# Patient Record
Sex: FEMALE | Race: WHITE | NOT HISPANIC OR LATINO | URBAN - METROPOLITAN AREA
[De-identification: names, ages, dates, MRNs, and addresses within clinical notes are randomized per-mention and may not be internally consistent; named-entity substitution may affect disease eponyms.]

---

## 2018-02-28 ENCOUNTER — OUTPATIENT (OUTPATIENT)
Dept: OUTPATIENT SERVICES | Facility: HOSPITAL | Age: 63
LOS: 1 days | Discharge: HOME | End: 2018-02-28

## 2018-02-28 DIAGNOSIS — Z12.31 ENCOUNTER FOR SCREENING MAMMOGRAM FOR MALIGNANT NEOPLASM OF BREAST: ICD-10-CM

## 2019-05-15 ENCOUNTER — OUTPATIENT (OUTPATIENT)
Dept: OUTPATIENT SERVICES | Facility: HOSPITAL | Age: 64
LOS: 1 days | Discharge: HOME | End: 2019-05-15
Payer: COMMERCIAL

## 2019-05-15 DIAGNOSIS — Z12.31 ENCOUNTER FOR SCREENING MAMMOGRAM FOR MALIGNANT NEOPLASM OF BREAST: ICD-10-CM

## 2019-05-15 PROCEDURE — 77067 SCR MAMMO BI INCL CAD: CPT | Mod: 26

## 2019-05-15 PROCEDURE — 77063 BREAST TOMOSYNTHESIS BI: CPT | Mod: 26

## 2019-05-16 DIAGNOSIS — Z78.0 ASYMPTOMATIC MENOPAUSAL STATE: ICD-10-CM

## 2019-05-16 DIAGNOSIS — M89.9 DISORDER OF BONE, UNSPECIFIED: ICD-10-CM

## 2019-05-16 DIAGNOSIS — Z13.820 ENCOUNTER FOR SCREENING FOR OSTEOPOROSIS: ICD-10-CM

## 2020-09-30 ENCOUNTER — OUTPATIENT (OUTPATIENT)
Dept: OUTPATIENT SERVICES | Facility: HOSPITAL | Age: 65
LOS: 1 days | Discharge: HOME | End: 2020-09-30
Payer: COMMERCIAL

## 2020-09-30 DIAGNOSIS — Z12.31 ENCOUNTER FOR SCREENING MAMMOGRAM FOR MALIGNANT NEOPLASM OF BREAST: ICD-10-CM

## 2020-09-30 PROCEDURE — 77067 SCR MAMMO BI INCL CAD: CPT | Mod: 26

## 2020-09-30 PROCEDURE — 77063 BREAST TOMOSYNTHESIS BI: CPT | Mod: 26

## 2022-02-03 ENCOUNTER — OUTPATIENT (OUTPATIENT)
Dept: OUTPATIENT SERVICES | Facility: HOSPITAL | Age: 67
LOS: 1 days | Discharge: HOME | End: 2022-02-03
Payer: COMMERCIAL

## 2022-02-03 DIAGNOSIS — Z12.31 ENCOUNTER FOR SCREENING MAMMOGRAM FOR MALIGNANT NEOPLASM OF BREAST: ICD-10-CM

## 2022-02-03 PROCEDURE — 77067 SCR MAMMO BI INCL CAD: CPT | Mod: 26

## 2022-02-03 PROCEDURE — 77063 BREAST TOMOSYNTHESIS BI: CPT | Mod: 26

## 2022-02-04 DIAGNOSIS — Z13.820 ENCOUNTER FOR SCREENING FOR OSTEOPOROSIS: ICD-10-CM

## 2022-02-04 DIAGNOSIS — M89.9 DISORDER OF BONE, UNSPECIFIED: ICD-10-CM

## 2022-02-04 DIAGNOSIS — Z78.0 ASYMPTOMATIC MENOPAUSAL STATE: ICD-10-CM

## 2023-08-09 ENCOUNTER — OUTPATIENT (OUTPATIENT)
Dept: OUTPATIENT SERVICES | Facility: HOSPITAL | Age: 68
LOS: 1 days | End: 2023-08-09
Payer: COMMERCIAL

## 2023-08-09 DIAGNOSIS — Z12.31 ENCOUNTER FOR SCREENING MAMMOGRAM FOR MALIGNANT NEOPLASM OF BREAST: ICD-10-CM

## 2023-08-09 PROCEDURE — 77063 BREAST TOMOSYNTHESIS BI: CPT

## 2023-08-09 PROCEDURE — 77067 SCR MAMMO BI INCL CAD: CPT

## 2023-08-09 PROCEDURE — 77067 SCR MAMMO BI INCL CAD: CPT | Mod: 26

## 2023-08-09 PROCEDURE — 77063 BREAST TOMOSYNTHESIS BI: CPT | Mod: 26

## 2023-08-10 DIAGNOSIS — Z12.31 ENCOUNTER FOR SCREENING MAMMOGRAM FOR MALIGNANT NEOPLASM OF BREAST: ICD-10-CM

## 2023-09-27 PROBLEM — Z00.00 ENCOUNTER FOR PREVENTIVE HEALTH EXAMINATION: Status: ACTIVE | Noted: 2023-09-27

## 2024-09-18 ENCOUNTER — EMERGENCY (EMERGENCY)
Facility: HOSPITAL | Age: 69
LOS: 0 days | Discharge: HOME | End: 2024-09-18
Attending: STUDENT IN AN ORGANIZED HEALTH CARE EDUCATION/TRAINING PROGRAM
Payer: MEDICARE

## 2024-09-18 VITALS
OXYGEN SATURATION: 100 % | HEIGHT: 65 IN | WEIGHT: 138.01 LBS | RESPIRATION RATE: 18 BRPM | DIASTOLIC BLOOD PRESSURE: 88 MMHG | TEMPERATURE: 98 F | SYSTOLIC BLOOD PRESSURE: 152 MMHG | HEART RATE: 67 BPM

## 2024-09-18 DIAGNOSIS — K21.9 GASTRO-ESOPHAGEAL REFLUX DISEASE WITHOUT ESOPHAGITIS: ICD-10-CM

## 2024-09-18 DIAGNOSIS — M54.89 OTHER DORSALGIA: ICD-10-CM

## 2024-09-18 DIAGNOSIS — M54.6 PAIN IN THORACIC SPINE: ICD-10-CM

## 2024-09-18 PROCEDURE — 96372 THER/PROPH/DIAG INJ SC/IM: CPT

## 2024-09-18 PROCEDURE — 93005 ELECTROCARDIOGRAM TRACING: CPT

## 2024-09-18 PROCEDURE — 99285 EMERGENCY DEPT VISIT HI MDM: CPT | Mod: FS

## 2024-09-18 PROCEDURE — 93010 ELECTROCARDIOGRAM REPORT: CPT

## 2024-09-18 PROCEDURE — 99283 EMERGENCY DEPT VISIT LOW MDM: CPT | Mod: 25

## 2024-09-18 PROCEDURE — 71046 X-RAY EXAM CHEST 2 VIEWS: CPT

## 2024-09-18 PROCEDURE — 71046 X-RAY EXAM CHEST 2 VIEWS: CPT | Mod: 26

## 2024-09-18 RX ORDER — KETOROLAC TROMETHAMINE 30 MG/ML
30 INJECTION, SOLUTION INTRAMUSCULAR ONCE
Refills: 0 | Status: DISCONTINUED | OUTPATIENT
Start: 2024-09-18 | End: 2024-09-18

## 2024-09-18 RX ADMIN — KETOROLAC TROMETHAMINE 30 MILLIGRAM(S): 30 INJECTION, SOLUTION INTRAMUSCULAR at 15:49

## 2024-09-18 NOTE — ED PROVIDER NOTE - ATTENDING APP SHARED VISIT CONTRIBUTION OF CARE
70 yo F with hx of GERD who presents with nonradiating R upper back pain x1 wk. Taking advil without relief. Called PMD who made 1st appt for 9/30 but pt couldn't wait until then so came to ED. No fever, chills, night sweats, weight loss, BLE weakness/numbness, saddle anesthesia, urinary/fecal incontinence/retention, dysuria, hematuria. No recent trauma/spinal instrumentation. No hx of immunocompromise. No hx of coagulopathy or AC use. No hx of IVDU. No rash. R hand dominant but denies heavy lifting.    PMD Dr. Nath    CONSTITUTIONAL: well developed, nontoxic appearing, in no acute distress, speaking in full sentences  SKIN: warm, dry, no rash, cap refill < 2 seconds  HEENT: normocephalic, atraumatic, no conjunctival erythema, moist mucous membranes, patent airway  NECK: supple  CV:  regular rate, regular rhythm, 2+ radial pulses bilaterally  RESP: no wheezes, no rales, no rhonchi, normal work of breathing  ABD: soft, no tenderness, nondistended, no rebound, no guarding  BACK: no midline T/L/S-spine tenderness, no step off, no deformity, mild paraspinal R thoracic tenderness, no ecchymosis, no vesicles  MSK: normal ROM, no cyanosis, no edema  NEURO: alert, oriented, grossly unremarkable  PSYCH: cooperative, appropriate    A&P:  Pt here with atraumatic back pain, likely MSK. No midline spine tenderness. No neuro deficits. Low suspicion for cauda equina syndrome, spinal cord injury, fx, transverse myelitis, epidural abscess/hematoma, osteomyelitis, discitis, pyelonephritis at this time given no red flag sx, no systemic sx, no hx of trauma, no focal neuro deficits, no urinary sx. Plan for ekg, cxr r/o arrhythmia, ptx, pneumonia.

## 2024-09-18 NOTE — ED ADULT TRIAGE NOTE - TEMPERATURE IN FAHRENHEIT (DEGREES F)
Patient's chart reviewed, please contact to schedule OA colonoscopy with .Patient Preference      Schedule Procedure:   Please Schedule Routine (next available or patient preference)  Procedure: Colonoscopy (49571) with MD preference for bowel prep.   Diagnosis: Colon Cancer Screening Z12.11  Is patient:    Diabetic? No   ANTIPLATELET / ANTICOAGULATION: MEDICATION:  None  Latex allergy: Yes- Pruritus (itchy)  Sleep apnea: No  Location: Patient Preference  Sedation: MAC Anesthesia (hx of Xanax on med list-anxiety/for sleep)    Special Instructions: None      Covid: Fully Vaccinated  Yes  Immunocompromised:  No        98.1

## 2024-09-18 NOTE — ED ADULT TRIAGE NOTE - NS ED NURSE DIRECT TO ROOM YN
Yes Admission Reconciliation is Not Complete  Discharge Reconciliation is Not Complete Admission Reconciliation is Completed  Discharge Reconciliation is Not Complete Admission Reconciliation is Completed  Discharge Reconciliation is Completed

## 2024-09-18 NOTE — ED PROVIDER NOTE - DIFFERENTIAL DIAGNOSIS
Differential Diagnosis MSK pain, cauda equina syndrome, spinal cord injury, fx, transverse myelitis, epidural abscess/hematoma, osteomyelitis, discitis, pyelonephritis, arrhythmia, ptx, pneumonia

## 2024-09-18 NOTE — ED ADULT NURSE NOTE - NSFALLUNIVINTERV_ED_ALL_ED
Bed/Stretcher in lowest position, wheels locked, appropriate side rails in place/Call bell, personal items and telephone in reach/Instruct patient to call for assistance before getting out of bed/chair/stretcher/Non-slip footwear applied when patient is off stretcher/[place] to call system/Physically safe environment - no spills, clutter or unnecessary equipment/Purposeful proactive rounding/Room/bathroom lighting operational, light cord in reach

## 2024-09-18 NOTE — ED PROVIDER NOTE - CLINICAL SUMMARY MEDICAL DECISION MAKING FREE TEXT BOX
Pt here with atraumatic back pain, likely MSK. No midline spine tenderness. No neuro deficits. Low suspicion for cauda equina syndrome, spinal cord injury, fx, transverse myelitis, epidural abscess/hematoma, osteomyelitis, discitis, pyelonephritis at this time given no red flag sx, no systemic sx, no hx of trauma, no focal neuro deficits, no urinary sx. Plan for ekg, cxr r/o arrhythmia, ptx, pneumonia. Ekg with mild TWI in aVL, no prior ekg for comparison. Cxr clear. Pain improved. Pt has close PMD f/u in 2 wks. Considered observation vs admission however pt is a good candidate for d/c home with outpatient f/u given that no life threatening pathology found in ED and pt has close outpatient f/u. Strict ED return precautions given. Pt verbalized understanding and was agreeable with plan.

## 2024-09-18 NOTE — ED PROVIDER NOTE - PATIENT PORTAL LINK FT
You can access the FollowMyHealth Patient Portal offered by [facility] by registering at the following website: [email or web address]. By joining [facility]’s FollowMyHealth portal, you will also be able to view your health information using other applications (apps) compatible with our system.

## 2024-09-18 NOTE — ED PROVIDER NOTE - NSFOLLOWUPINSTRUCTIONS_ED_ALL_ED_FT
Back Pain    WHAT YOU NEED TO KNOW:    What do I need to know about back pain? Back pain is common. You may have back pain and muscle spasms. You may feel sore or stiff on one or both sides of your back. The pain may spread to your lower body. Conditions that affect the spine, joints, or muscles can cause back pain. These may include arthritis, spinal stenosis (narrowing of the spinal column), muscle tension, or breakdown of the spinal discs.    What increases my risk for back pain?   •Repeated bending, lifting, or twisting, or lifting heavy items  •Injury from a fall or accident  •Lack of regular physical activity   •Obesity or pregnancy   •Smoking  •Aging  •Driving, sitting, or standing for long periods  •Bad posture while sitting or standing    How is back pain diagnosed? Your healthcare provider will ask if you have any medical conditions. He or she may ask if you have a history of back pain and how it started. He or she may watch you stand and walk, and check your range of motion. Show him or her where you feel pain and what makes it better or worse. Describe the pain, how bad it is, and how long it lasts. Tell your provider if your pain worsens at night or when you lie on your back.    How is back pain treated?   •Medicines:      •NSAIDs, such as ibuprofen, help decrease swelling, pain, and fever. This medicine is available with or without a doctor's order. NSAIDs may be given as a pill or as a cream that is applied to your back. NSAIDs can cause stomach bleeding or kidney problems in certain people. If you take blood thinner medicine, always ask your healthcare provider if NSAIDs are safe for you. Always read the medicine label and follow directions.     •Acetaminophen decreases pain and fever. It is available without a doctor's order. Ask how much to take and how often to take it. Follow directions. Read the labels of all other medicines you are using to see if they also contain acetaminophen, or ask your doctor or pharmacist. Acetaminophen can cause liver damage if not taken correctly. Do not use more than 4 grams (4,000 milligrams) total of acetaminophen in one day.      •Muscle relaxers help decrease muscle spasms and back pain.  •Acupressure may be recommended to decrease pain and improve movement. Acupressure is pressure or localized massage to the area of your back pain.   •A transcutaneous electrical nerve stimulation (TENS) unit is a portable, pocket-sized, battery-powered device that attaches to your skin. It is usually placed over the area of pain. It uses mild, safe electrical signals to help control pain.    How do I manage my back pain?   •Apply ice on your back for 15 to 20 minutes every hour or as directed. Use an ice pack, or put crushed ice in a plastic bag. Cover it with a towel before you apply it to your skin. Ice helps prevent tissue damage and decreases pain.  •Apply heat on your back for 20 to 30 minutes every 2 hours for as many days as directed. Heat helps decrease pain and muscle spasms.  •Stay active as much as you can without causing more pain. Bed rest could make your back pain worse. Avoid heavy lifting until your pain is gone.  •Go to physical therapy as directed. A physical therapist can teach you exercises to help improve movement and strength, and to decrease pain.    Call your local emergency number (911 in the [place]) if:   •You have severe back pain with chest pain.  •You cannot control your urine or bowel movements.  •Your pain becomes so severe that you cannot walk.    When should I seek immediate care?   •You have pain, numbness, or weakness in one or both legs.  •You have severe back pain, nausea, and vomiting.  •You have severe back pain that spreads to your side or genital area.    When should I call my doctor?   •You have back pain that does not get better with rest and pain medicine.  •You have a fever.  •You have pain that worsens when you are on your back or when you rest.  •You have pain that worsens when you cough or sneeze.  •You lose weight without trying.  •You have questions or concerns about your condition or care.    CARE AGREEMENT:    You have the right to help plan your care. Learn about your health condition and how it may be treated. Discuss treatment options with your healthcare providers to decide what care you want to receive. You always have the right to refuse treatment.

## 2024-09-18 NOTE — ED PROVIDER NOTE - OBJECTIVE STATEMENT
Patient is a 69-year-old female no known medical history here for evaluation of atraumatic right upper back pain for 5 days with no aggravating alleviating factors.  Patient denies cough, fever, chills, chest pain, shortness of breath, leg swelling, rash

## 2024-09-18 NOTE — ED PROVIDER NOTE - PHYSICAL EXAMINATION
VITAL SIGNS: I have reviewed nursing notes and confirm.  CONSTITUTIONAL: Well-developed; well-nourished  SKIN:  skin exam is warm and dry, no acute rash.    HEAD: Normocephalic; atraumatic.  EYES:  conjunctiva and sclera clear.  ENT: No nasal discharge; airway clear.  CARD: S1, S2 normal; no murmurs, gallops, or rubs. Regular rate and rhythm.   RESP: No wheezes, rales or rhonchi.  EXT: Normal ROM.  No clubbing, cyanosis or edema.   NEURO: Alert, oriented, grossly unremarkable

## 2024-11-07 PROBLEM — K21.9 GASTRO-ESOPHAGEAL REFLUX DISEASE WITHOUT ESOPHAGITIS: Chronic | Status: ACTIVE | Noted: 2024-09-18

## 2024-11-11 ENCOUNTER — OUTPATIENT (OUTPATIENT)
Dept: OUTPATIENT SERVICES | Facility: HOSPITAL | Age: 69
LOS: 1 days | End: 2024-11-11
Payer: COMMERCIAL

## 2024-11-11 DIAGNOSIS — Z13.820 ENCOUNTER FOR SCREENING FOR OSTEOPOROSIS: ICD-10-CM

## 2024-11-11 DIAGNOSIS — Z00.8 ENCOUNTER FOR OTHER GENERAL EXAMINATION: ICD-10-CM

## 2024-11-11 DIAGNOSIS — Z12.31 ENCOUNTER FOR SCREENING MAMMOGRAM FOR MALIGNANT NEOPLASM OF BREAST: ICD-10-CM

## 2024-11-11 PROCEDURE — 77080 DXA BONE DENSITY AXIAL: CPT

## 2024-11-11 PROCEDURE — 77080 DXA BONE DENSITY AXIAL: CPT | Mod: 26

## 2024-11-11 PROCEDURE — 77063 BREAST TOMOSYNTHESIS BI: CPT

## 2024-11-11 PROCEDURE — 77063 BREAST TOMOSYNTHESIS BI: CPT | Mod: 26

## 2024-11-11 PROCEDURE — 77067 SCR MAMMO BI INCL CAD: CPT

## 2024-11-11 PROCEDURE — 77067 SCR MAMMO BI INCL CAD: CPT | Mod: 26

## 2024-11-12 DIAGNOSIS — Z13.820 ENCOUNTER FOR SCREENING FOR OSTEOPOROSIS: ICD-10-CM

## 2024-11-12 DIAGNOSIS — Z12.31 ENCOUNTER FOR SCREENING MAMMOGRAM FOR MALIGNANT NEOPLASM OF BREAST: ICD-10-CM

## 2025-06-01 ENCOUNTER — INPATIENT (INPATIENT)
Facility: HOSPITAL | Age: 70
LOS: 1 days | Discharge: HOME | DRG: 391 | End: 2025-06-03
Attending: STUDENT IN AN ORGANIZED HEALTH CARE EDUCATION/TRAINING PROGRAM | Admitting: STUDENT IN AN ORGANIZED HEALTH CARE EDUCATION/TRAINING PROGRAM
Payer: COMMERCIAL

## 2025-06-01 VITALS
SYSTOLIC BLOOD PRESSURE: 134 MMHG | HEART RATE: 73 BPM | DIASTOLIC BLOOD PRESSURE: 86 MMHG | HEIGHT: 64 IN | TEMPERATURE: 98 F | OXYGEN SATURATION: 99 % | WEIGHT: 139.99 LBS | RESPIRATION RATE: 18 BRPM

## 2025-06-01 DIAGNOSIS — I21.4 NON-ST ELEVATION (NSTEMI) MYOCARDIAL INFARCTION: ICD-10-CM

## 2025-06-01 LAB
ALBUMIN SERPL ELPH-MCNC: 4.3 G/DL — SIGNIFICANT CHANGE UP (ref 3.5–5.2)
ALP SERPL-CCNC: 86 U/L — SIGNIFICANT CHANGE UP (ref 30–115)
ALT FLD-CCNC: 13 U/L — SIGNIFICANT CHANGE UP (ref 0–41)
ANION GAP SERPL CALC-SCNC: 13 MMOL/L — SIGNIFICANT CHANGE UP (ref 7–14)
APTT BLD: 28.9 SEC — SIGNIFICANT CHANGE UP (ref 27–39.2)
AST SERPL-CCNC: 34 U/L — SIGNIFICANT CHANGE UP (ref 0–41)
BASOPHILS # BLD AUTO: 0.04 K/UL — SIGNIFICANT CHANGE UP (ref 0–0.2)
BASOPHILS NFR BLD AUTO: 0.5 % — SIGNIFICANT CHANGE UP (ref 0–1)
BILIRUB SERPL-MCNC: 0.5 MG/DL — SIGNIFICANT CHANGE UP (ref 0.2–1.2)
BUN SERPL-MCNC: 9 MG/DL — LOW (ref 10–20)
CALCIUM SERPL-MCNC: 9.3 MG/DL — SIGNIFICANT CHANGE UP (ref 8.4–10.5)
CHLORIDE SERPL-SCNC: 96 MMOL/L — LOW (ref 98–110)
CO2 SERPL-SCNC: 25 MMOL/L — SIGNIFICANT CHANGE UP (ref 17–32)
CREAT SERPL-MCNC: 0.6 MG/DL — LOW (ref 0.7–1.5)
EGFR: 98 ML/MIN/1.73M2 — SIGNIFICANT CHANGE UP
EGFR: 98 ML/MIN/1.73M2 — SIGNIFICANT CHANGE UP
EOSINOPHIL # BLD AUTO: 0.11 K/UL — SIGNIFICANT CHANGE UP (ref 0–0.7)
EOSINOPHIL NFR BLD AUTO: 1.4 % — SIGNIFICANT CHANGE UP (ref 0–8)
GAS PNL BLDV: SIGNIFICANT CHANGE UP
GLUCOSE SERPL-MCNC: 101 MG/DL — HIGH (ref 70–99)
HCT VFR BLD CALC: 42.3 % — SIGNIFICANT CHANGE UP (ref 37–47)
HGB BLD-MCNC: 13.8 G/DL — SIGNIFICANT CHANGE UP (ref 12–16)
IMM GRANULOCYTES NFR BLD AUTO: 0.4 % — HIGH (ref 0.1–0.3)
INR BLD: 0.98 RATIO — SIGNIFICANT CHANGE UP (ref 0.65–1.3)
LYMPHOCYTES # BLD AUTO: 1.33 K/UL — SIGNIFICANT CHANGE UP (ref 1.2–3.4)
LYMPHOCYTES # BLD AUTO: 16.8 % — LOW (ref 20.5–51.1)
MCHC RBC-ENTMCNC: 28 PG — SIGNIFICANT CHANGE UP (ref 27–31)
MCHC RBC-ENTMCNC: 32.6 G/DL — SIGNIFICANT CHANGE UP (ref 32–37)
MCV RBC AUTO: 86 FL — SIGNIFICANT CHANGE UP (ref 81–99)
MONOCYTES # BLD AUTO: 0.98 K/UL — HIGH (ref 0.1–0.6)
MONOCYTES NFR BLD AUTO: 12.3 % — HIGH (ref 1.7–9.3)
NEUTROPHILS # BLD AUTO: 5.45 K/UL — SIGNIFICANT CHANGE UP (ref 1.4–6.5)
NEUTROPHILS NFR BLD AUTO: 68.6 % — SIGNIFICANT CHANGE UP (ref 42.2–75.2)
NRBC BLD AUTO-RTO: 0 /100 WBCS — SIGNIFICANT CHANGE UP (ref 0–0)
NT-PROBNP SERPL-SCNC: 149 PG/ML — SIGNIFICANT CHANGE UP (ref 0–300)
PLATELET # BLD AUTO: 251 K/UL — SIGNIFICANT CHANGE UP (ref 130–400)
PMV BLD: 9 FL — SIGNIFICANT CHANGE UP (ref 7.4–10.4)
POTASSIUM SERPL-MCNC: 4.6 MMOL/L — SIGNIFICANT CHANGE UP (ref 3.5–5)
POTASSIUM SERPL-SCNC: 4.6 MMOL/L — SIGNIFICANT CHANGE UP (ref 3.5–5)
PROT SERPL-MCNC: 7.5 G/DL — SIGNIFICANT CHANGE UP (ref 6–8)
PROTHROM AB SERPL-ACNC: 11.6 SEC — SIGNIFICANT CHANGE UP (ref 9.95–12.87)
RBC # BLD: 4.92 M/UL — SIGNIFICANT CHANGE UP (ref 4.2–5.4)
RBC # FLD: 15 % — HIGH (ref 11.5–14.5)
SODIUM SERPL-SCNC: 134 MMOL/L — LOW (ref 135–146)
TROPONIN T, HIGH SENSITIVITY RESULT: 112 NG/L — CRITICAL HIGH (ref 6–13)
TROPONIN T, HIGH SENSITIVITY RESULT: 169 NG/L — CRITICAL HIGH (ref 6–13)
TROPONIN T, HIGH SENSITIVITY RESULT: 193 NG/L — CRITICAL HIGH (ref 6–13)
WBC # BLD: 7.94 K/UL — SIGNIFICANT CHANGE UP (ref 4.8–10.8)
WBC # FLD AUTO: 7.94 K/UL — SIGNIFICANT CHANGE UP (ref 4.8–10.8)

## 2025-06-01 PROCEDURE — 78452 HT MUSCLE IMAGE SPECT MULT: CPT

## 2025-06-01 PROCEDURE — 71046 X-RAY EXAM CHEST 2 VIEWS: CPT | Mod: 26

## 2025-06-01 PROCEDURE — 80053 COMPREHEN METABOLIC PANEL: CPT

## 2025-06-01 PROCEDURE — 83036 HEMOGLOBIN GLYCOSYLATED A1C: CPT

## 2025-06-01 PROCEDURE — A9500: CPT

## 2025-06-01 PROCEDURE — 93005 ELECTROCARDIOGRAM TRACING: CPT

## 2025-06-01 PROCEDURE — 80061 LIPID PANEL: CPT

## 2025-06-01 PROCEDURE — 99291 CRITICAL CARE FIRST HOUR: CPT | Mod: GC

## 2025-06-01 PROCEDURE — 85730 THROMBOPLASTIN TIME PARTIAL: CPT

## 2025-06-01 PROCEDURE — 93306 TTE W/DOPPLER COMPLETE: CPT

## 2025-06-01 PROCEDURE — 93017 CV STRESS TEST TRACING ONLY: CPT

## 2025-06-01 PROCEDURE — 85610 PROTHROMBIN TIME: CPT

## 2025-06-01 PROCEDURE — 84484 ASSAY OF TROPONIN QUANT: CPT

## 2025-06-01 PROCEDURE — 36415 COLL VENOUS BLD VENIPUNCTURE: CPT

## 2025-06-01 PROCEDURE — 85025 COMPLETE CBC W/AUTO DIFF WBC: CPT

## 2025-06-01 RX ORDER — ESOMEPRAZOLE MAGNESIUM 40 MG/1
1 CAPSULE, DELAYED RELEASE ORAL
Refills: 0 | DISCHARGE

## 2025-06-01 RX ORDER — ASPIRIN 325 MG
325 TABLET ORAL DAILY
Refills: 0 | Status: DISCONTINUED | OUTPATIENT
Start: 2025-06-01 | End: 2025-06-01

## 2025-06-01 RX ORDER — ASPIRIN 325 MG
81 TABLET ORAL DAILY
Refills: 0 | Status: DISCONTINUED | OUTPATIENT
Start: 2025-06-02 | End: 2025-06-03

## 2025-06-01 RX ORDER — MELATONIN 5 MG
3 TABLET ORAL AT BEDTIME
Refills: 0 | Status: DISCONTINUED | OUTPATIENT
Start: 2025-06-01 | End: 2025-06-03

## 2025-06-01 RX ORDER — ONDANSETRON HCL/PF 4 MG/2 ML
4 VIAL (ML) INJECTION EVERY 8 HOURS
Refills: 0 | Status: DISCONTINUED | OUTPATIENT
Start: 2025-06-01 | End: 2025-06-03

## 2025-06-01 RX ORDER — HEPARIN SODIUM 1000 [USP'U]/ML
750 INJECTION INTRAVENOUS; SUBCUTANEOUS
Qty: 25000 | Refills: 0 | Status: DISCONTINUED | OUTPATIENT
Start: 2025-06-01 | End: 2025-06-02

## 2025-06-01 RX ORDER — MAGNESIUM, ALUMINUM HYDROXIDE 200-200 MG
30 TABLET,CHEWABLE ORAL EVERY 4 HOURS
Refills: 0 | Status: DISCONTINUED | OUTPATIENT
Start: 2025-06-01 | End: 2025-06-03

## 2025-06-01 RX ORDER — ACETAMINOPHEN 500 MG/5ML
650 LIQUID (ML) ORAL EVERY 6 HOURS
Refills: 0 | Status: DISCONTINUED | OUTPATIENT
Start: 2025-06-01 | End: 2025-06-03

## 2025-06-01 RX ADMIN — HEPARIN SODIUM 750 UNIT(S)/HR: 1000 INJECTION INTRAVENOUS; SUBCUTANEOUS at 17:02

## 2025-06-01 RX ADMIN — Medication 325 MILLIGRAM(S): at 16:57

## 2025-06-01 NOTE — H&P ADULT - HISTORY OF PRESENT ILLNESS
70F w/PMHx of GERD presents to ED 2/2 SOB.  Patient reports symptoms began 5 days ago with sore throat, cough/congestion.  She reports she went to the [facility] today was diagnosed with URI and treated symptomatically.  She reports she went home and had an acute episode of SOB where she could not breathe.  She reports the symptoms gradually subsided but returned again shortly after.  At that point EMS was activated and patient transported to ED for evaluation.  Patient endorses 2 episodes of chest burning with radiation to LUE.  She reports it happened after cough once on 2 nights ago that lasted for 30m and subsided and another yesterday that subsided.  She does reports Hx of GERD and did not take her Nexium prior to the episodes.  No WHITNEY.  No abdominal or urinary complaints.

## 2025-06-01 NOTE — ED PROVIDER NOTE - PHYSICAL EXAMINATION
CONSTITUTIONAL: well-appearing, in NAD  SKIN: Warm dry, normal skin turgor  HEAD: NCAT  EYES: EOMI, PERRLA, no scleral icterus, conjunctiva pink  ENT: normal pharynx with no erythema or exudates  NECK: Supple; non tender. Full ROM.  CARD: RRR  RESP: clear to ausculation b/l. No crackles or wheezing.  ABD: soft, non-tender, non-distended, no rebound or guarding.  EXT: Full ROM, no bony tenderness, no pedal edema, no calf tenderness  NEURO: normal motor. normal sensory.  Normal gait.  PSYCH: Cooperative, appropriate.

## 2025-06-01 NOTE — ED PROVIDER NOTE - ATTENDING CONTRIBUTION TO CARE
I have personally performed a history and physical exam on this patient and personally directed the management of the patient. Patient is a 70-year-old female presents for evaluation of shortness of breath over the past 48 hours notes that she had a "sore throat with intermittent productive cough prompting visit to urgent care states that she had a successful breathing treatment was sent home upon arriving home patient had 2 episodes of shortness of breath states that it felt like she was being choked from a "flap in her throat" currently now asymptomatic denies any headache visual changes current chest pain shortness of breath diaphoresis nausea vomiting abdominal pain numbness tingling of extremities denies any weakness    On physical exam patient is normocephalic atraumatic pupils equal round react light accommodation extraocular muscles intact oropharynx clear chest clear to auscultation bilaterally abdomen soft nontender nondistended bowel sounds positive no guarding rebound tremors full range of motion no focal deficits noted    Assessment plan patient presents for evaluation of shortness of breath we obtained EKG per my independent evaluation not consistent with STEMI not consistent with arrhythmia not consistent with QT prolongation in addition maintain chest x-ray provide pain evaluation not consistent with pneumonia or pneumothorax patient found had elevated troponin initially 112 repeat 169 no significant elevation in BUN or creatinine we consulted cardiology who evaluated patient in the emergency department patient was given aspirin and initiated heparin here I will admit considering elevation of troponin despite the fact that the patient is asymptomatic with no evidence of STEMI on EKG patient was updated agrees with plan of care

## 2025-06-01 NOTE — ED PROVIDER NOTE - CLINICAL SUMMARY MEDICAL DECISION MAKING FREE TEXT BOX
Patient is a 70-year-old female presents for evaluation of shortness of breath over the past 48 hours notes that she had a "sore throat with intermittent productive cough prompting visit to urgent care states that she had a successful breathing treatment was sent home upon arriving home patient had 2 episodes of shortness of breath states that it felt like she was being choked from a "flap in her throat" currently now asymptomatic denies any headache visual changes current chest pain shortness of breath diaphoresis nausea vomiting abdominal pain numbness tingling of extremities denies any weakness    On physical exam patient is normocephalic atraumatic pupils equal round react light accommodation extraocular muscles intact oropharynx clear chest clear to auscultation bilaterally abdomen soft nontender nondistended bowel sounds positive no guarding rebound tremors full range of motion no focal deficits noted    Assessment plan patient presents for evaluation of shortness of breath we obtained EKG per my independent evaluation not consistent with STEMI not consistent with arrhythmia not consistent with QT prolongation in addition maintain chest x-ray provide pain evaluation not consistent with pneumonia or pneumothorax patient found had elevated troponin initially 112 repeat 169 no significant elevation in BUN or creatinine we consulted cardiology who evaluated patient in the emergency department patient was given aspirin and initiated heparin here I will admit considering elevation of troponin despite the fact that the patient is asymptomatic with no evidence of STEMI on EKG patient was updated agrees with plan of care

## 2025-06-01 NOTE — H&P ADULT - NSHPPHYSICALEXAM_GEN_ALL_CORE
Vital Signs (24 Hrs):  T(C): 36.7 (06-01-25 @ 14:34), Max: 36.7 (06-01-25 @ 14:34)  HR: 84 (06-01-25 @ 14:34) (73 - 84)  BP: 118/74 (06-01-25 @ 14:34) (118/74 - 134/86)  RR: 18 (06-01-25 @ 14:34) (18 - 18)  SpO2: 98% (06-01-25 @ 14:34) (98% - 100%)    PHYSICAL EXAM:  GENERAL: NAD, well-developed  SKIN: No rashes or lesions  HEAD:  Atraumatic, Normocephalic  EYES: EOMI, PERRLA, conjunctiva and sclera clear  NECK: Supple, No JVD  CHEST/LUNG: Clear to auscultation bilaterally; No wheeze  HEART: Regular rate and rhythm; No murmurs, rubs, or gallops  ABDOMEN: Soft, Nontender, Nondistended; Bowel sounds present  EXTREMITIES:  No clubbing, cyanosis, or edema  CNS: AAOx3

## 2025-06-01 NOTE — CHART NOTE - NSCHARTNOTEFT_GEN_A_CORE
received signout from day team to f/u trop for pt admitted w/ NSTEMI;  trop 193 from previous trop 169    above d/w Dr. Ivan- stat ecg ordered accordingly

## 2025-06-01 NOTE — H&P ADULT - NS ATTEND AMEND GEN_ALL_CORE FT
patient seen and case/plan discussed w PA on 6/1  not written 6/2    Patient presenting  w NSTEMI   load w Aspirin 325mg  Start heparin ggt  consult cardio

## 2025-06-01 NOTE — H&P ADULT - ASSESSMENT
70F w/PMHx of GERD presents to ED 2/2 SOB, admitted to medicine service for further management    #NSTEMI  #SOB 2/2 ACS  - admit to medicine  - monitor on tele  - Load with ASA 325mg then 81mg QD  - will c/w Heparin gtt started in ED  - trend HST, next at 7p  - check ECHO  - check lipids/A1C  - EKG in AM  - Cardio c/s    #URI  - symptomatic Rx    Diet: DASH  Activity: OOB  DVT PPX: Heparin  GI PPX: PPI  Code status: Full  Dispo: Home  CHG 2% Wipes QD

## 2025-06-01 NOTE — H&P ADULT - NSHPLABSRESULTS_GEN_ALL_CORE
13.8   7.94  )-----------( 251      ( 01 Jun 2025 11:50 )             42.3       06-01    134[L]  |  96[L]  |  9[L]  ----------------------------<  101[H]  4.6   |  25  |  0.6[L]    Ca    9.3      01 Jun 2025 11:50    TPro  7.5  /  Alb  4.3  /  TBili  0.5  /  DBili  x   /  AST  34  /  ALT  13  /  AlkPhos  86  06-01          Urinalysis Basic - ( 01 Jun 2025 11:50 )    Color: x / Appearance: x / SG: x / pH: x  Gluc: 101 mg/dL / Ketone: x  / Bili: x / Urobili: x   Blood: x / Protein: x / Nitrite: x   Leuk Esterase: x / RBC: x / WBC x   Sq Epi: x / Non Sq Epi: x / Bacteria: x    CXR    IMPRESSION:    No radiographic evidence of acute cardiopulmonary disease.

## 2025-06-01 NOTE — ED PROVIDER NOTE - OBJECTIVE STATEMENT
Patient is a 70-year-old female past medical history of acid reflux presenting to the ED for shortness of breath.  Patient states on Tuesday she started having sore throat and productive cough.  Patient was having some trouble breathing earlier today and went to urgent care.  Patient had a breathing treatment and was sent home.  Upon arriving home patient had 2 episodes of shortness of breath.  Patient states she has had these episodes in the past and got diagnosed with a "flap in her throat".  Patient is now back to baseline but does endorse a pressure in her right substernal region.  No fevers, nausea, vomiting, chills,, diarrhea

## 2025-06-02 ENCOUNTER — RESULT REVIEW (OUTPATIENT)
Age: 70
End: 2025-06-02

## 2025-06-02 LAB
A1C WITH ESTIMATED AVERAGE GLUCOSE RESULT: 5.7 % — HIGH (ref 4–5.6)
ALBUMIN SERPL ELPH-MCNC: 4.1 G/DL — SIGNIFICANT CHANGE UP (ref 3.5–5.2)
ALP SERPL-CCNC: 78 U/L — SIGNIFICANT CHANGE UP (ref 30–115)
ALT FLD-CCNC: 11 U/L — SIGNIFICANT CHANGE UP (ref 0–41)
ANION GAP SERPL CALC-SCNC: 14 MMOL/L — SIGNIFICANT CHANGE UP (ref 7–14)
APTT BLD: 51.5 SEC — HIGH (ref 27–39.2)
APTT BLD: 57.8 SEC — HIGH (ref 27–39.2)
AST SERPL-CCNC: 25 U/L — SIGNIFICANT CHANGE UP (ref 0–41)
BASOPHILS # BLD AUTO: 0.04 K/UL — SIGNIFICANT CHANGE UP (ref 0–0.2)
BASOPHILS NFR BLD AUTO: 0.6 % — SIGNIFICANT CHANGE UP (ref 0–1)
BILIRUB SERPL-MCNC: 0.4 MG/DL — SIGNIFICANT CHANGE UP (ref 0.2–1.2)
BUN SERPL-MCNC: 12 MG/DL — SIGNIFICANT CHANGE UP (ref 10–20)
CALCIUM SERPL-MCNC: 9.2 MG/DL — SIGNIFICANT CHANGE UP (ref 8.4–10.5)
CHLORIDE SERPL-SCNC: 100 MMOL/L — SIGNIFICANT CHANGE UP (ref 98–110)
CHOLEST SERPL-MCNC: 182 MG/DL — SIGNIFICANT CHANGE UP
CO2 SERPL-SCNC: 24 MMOL/L — SIGNIFICANT CHANGE UP (ref 17–32)
CREAT SERPL-MCNC: 0.5 MG/DL — LOW (ref 0.7–1.5)
EGFR: 102 ML/MIN/1.73M2 — SIGNIFICANT CHANGE UP
EGFR: 102 ML/MIN/1.73M2 — SIGNIFICANT CHANGE UP
EOSINOPHIL # BLD AUTO: 0.34 K/UL — SIGNIFICANT CHANGE UP (ref 0–0.7)
EOSINOPHIL NFR BLD AUTO: 5 % — SIGNIFICANT CHANGE UP (ref 0–8)
ESTIMATED AVERAGE GLUCOSE: 117 MG/DL — HIGH (ref 68–114)
GLUCOSE SERPL-MCNC: 96 MG/DL — SIGNIFICANT CHANGE UP (ref 70–99)
HCT VFR BLD CALC: 40 % — SIGNIFICANT CHANGE UP (ref 37–47)
HDLC SERPL-MCNC: 70 MG/DL — SIGNIFICANT CHANGE UP
HGB BLD-MCNC: 12.8 G/DL — SIGNIFICANT CHANGE UP (ref 12–16)
IMM GRANULOCYTES NFR BLD AUTO: 0.1 % — SIGNIFICANT CHANGE UP (ref 0.1–0.3)
INR BLD: 0.97 RATIO — SIGNIFICANT CHANGE UP (ref 0.65–1.3)
LDLC SERPL-MCNC: 96 MG/DL — SIGNIFICANT CHANGE UP
LIPID PNL WITH DIRECT LDL SERPL: 96 MG/DL — SIGNIFICANT CHANGE UP
LYMPHOCYTES # BLD AUTO: 2.33 K/UL — SIGNIFICANT CHANGE UP (ref 1.2–3.4)
LYMPHOCYTES # BLD AUTO: 34 % — SIGNIFICANT CHANGE UP (ref 20.5–51.1)
MCHC RBC-ENTMCNC: 27.5 PG — SIGNIFICANT CHANGE UP (ref 27–31)
MCHC RBC-ENTMCNC: 32 G/DL — SIGNIFICANT CHANGE UP (ref 32–37)
MCV RBC AUTO: 86 FL — SIGNIFICANT CHANGE UP (ref 81–99)
MONOCYTES # BLD AUTO: 0.8 K/UL — HIGH (ref 0.1–0.6)
MONOCYTES NFR BLD AUTO: 11.7 % — HIGH (ref 1.7–9.3)
NEUTROPHILS # BLD AUTO: 3.34 K/UL — SIGNIFICANT CHANGE UP (ref 1.4–6.5)
NEUTROPHILS NFR BLD AUTO: 48.6 % — SIGNIFICANT CHANGE UP (ref 42.2–75.2)
NONHDLC SERPL-MCNC: 112 MG/DL — SIGNIFICANT CHANGE UP
NRBC BLD AUTO-RTO: 0 /100 WBCS — SIGNIFICANT CHANGE UP (ref 0–0)
PLATELET # BLD AUTO: 244 K/UL — SIGNIFICANT CHANGE UP (ref 130–400)
PMV BLD: 9.1 FL — SIGNIFICANT CHANGE UP (ref 7.4–10.4)
POTASSIUM SERPL-MCNC: 3.6 MMOL/L — SIGNIFICANT CHANGE UP (ref 3.5–5)
POTASSIUM SERPL-SCNC: 3.6 MMOL/L — SIGNIFICANT CHANGE UP (ref 3.5–5)
PROT SERPL-MCNC: 6.8 G/DL — SIGNIFICANT CHANGE UP (ref 6–8)
PROTHROM AB SERPL-ACNC: 11.4 SEC — SIGNIFICANT CHANGE UP (ref 9.95–12.87)
RBC # BLD: 4.65 M/UL — SIGNIFICANT CHANGE UP (ref 4.2–5.4)
RBC # FLD: 14.7 % — HIGH (ref 11.5–14.5)
SODIUM SERPL-SCNC: 138 MMOL/L — SIGNIFICANT CHANGE UP (ref 135–146)
TRIGL SERPL-MCNC: 89 MG/DL — SIGNIFICANT CHANGE UP
TROPONIN T, HIGH SENSITIVITY RESULT: 105 NG/L — CRITICAL HIGH (ref 6–13)
TROPONIN T, HIGH SENSITIVITY RESULT: 70 NG/L — CRITICAL HIGH (ref 6–13)
WBC # BLD: 6.86 K/UL — SIGNIFICANT CHANGE UP (ref 4.8–10.8)
WBC # FLD AUTO: 6.86 K/UL — SIGNIFICANT CHANGE UP (ref 4.8–10.8)

## 2025-06-02 PROCEDURE — 99232 SBSQ HOSP IP/OBS MODERATE 35: CPT

## 2025-06-02 PROCEDURE — 99223 1ST HOSP IP/OBS HIGH 75: CPT

## 2025-06-02 PROCEDURE — 93018 CV STRESS TEST I&R ONLY: CPT

## 2025-06-02 PROCEDURE — 93306 TTE W/DOPPLER COMPLETE: CPT | Mod: 26

## 2025-06-02 PROCEDURE — 93016 CV STRESS TEST SUPVJ ONLY: CPT

## 2025-06-02 PROCEDURE — 78452 HT MUSCLE IMAGE SPECT MULT: CPT | Mod: 26

## 2025-06-02 PROCEDURE — 93010 ELECTROCARDIOGRAM REPORT: CPT | Mod: 76

## 2025-06-02 RX ORDER — DEXTROMETHORPHAN HBR, GUAIFENESIN 200 MG/10ML
600 LIQUID ORAL EVERY 12 HOURS
Refills: 0 | Status: DISCONTINUED | OUTPATIENT
Start: 2025-06-02 | End: 2025-06-03

## 2025-06-02 RX ORDER — REGADENOSON 0.08 MG/ML
0.4 INJECTION, SOLUTION INTRAVENOUS ONCE
Refills: 0 | Status: DISCONTINUED | OUTPATIENT
Start: 2025-06-02 | End: 2025-06-03

## 2025-06-02 RX ADMIN — HEPARIN SODIUM 850 UNIT(S)/HR: 1000 INJECTION INTRAVENOUS; SUBCUTANEOUS at 08:07

## 2025-06-02 RX ADMIN — DEXTROMETHORPHAN HBR, GUAIFENESIN 600 MILLIGRAM(S): 200 LIQUID ORAL at 23:12

## 2025-06-02 RX ADMIN — HEPARIN SODIUM 850 UNIT(S)/HR: 1000 INJECTION INTRAVENOUS; SUBCUTANEOUS at 09:03

## 2025-06-02 RX ADMIN — Medication 40 MILLIGRAM(S): at 06:47

## 2025-06-02 RX ADMIN — HEPARIN SODIUM 850 UNIT(S)/HR: 1000 INJECTION INTRAVENOUS; SUBCUTANEOUS at 02:29

## 2025-06-02 NOTE — PROGRESS NOTE ADULT - SUBJECTIVE AND OBJECTIVE BOX
Patient is a 70y old  Female who presents with a chief complaint of SOB x 1 day (02 Jun 2025 09:14)      OVERNIGHT EVENTS: no CP or SOB     SUBJECTIVE / INTERVAL HPI: Patient seen and examined at bedside.     VITAL SIGNS:  Vital Signs Last 24 Hrs  T(C): 36.9 (02 Jun 2025 15:49), Max: 36.9 (02 Jun 2025 15:49)  T(F): 98.5 (02 Jun 2025 15:49), Max: 98.5 (02 Jun 2025 15:49)  HR: 94 (02 Jun 2025 15:49) (72 - 94)  BP: 116/87 (02 Jun 2025 15:49) (116/87 - 117/72)  BP(mean): --  RR: 18 (02 Jun 2025 15:49) (18 - 18)  SpO2: 95% (02 Jun 2025 15:49) (95% - 97%)    Parameters below as of 02 Jun 2025 15:49  Patient On (Oxygen Delivery Method): room air        PHYSICAL EXAM:    General: WDWN  HEENT: NC/AT; PERRL, clear conjunctiva  Neck: supple  Cardiovascular: +S1/S2; RRR  Respiratory: CTA b/l; no W/R/R  Gastrointestinal: soft, NT/ND; +BSx4  Extremities: WWP; 2+ peripheral pulses; no edema   Neurological: AAOx3; no focal deficits    MEDICATIONS:  MEDICATIONS  (STANDING):  aspirin  chewable 81 milliGRAM(s) Oral daily  chlorhexidine 2% Cloths 1 Application(s) Topical <User Schedule>  heparin  Infusion. 750 Unit(s)/Hr (7.5 mL/Hr) IV Continuous <Continuous>  pantoprazole    Tablet 40 milliGRAM(s) Oral before breakfast  regadenoson Injectable 0.4 milliGRAM(s) IV Push once    MEDICATIONS  (PRN):  acetaminophen     Tablet .. 650 milliGRAM(s) Oral every 6 hours PRN Temp greater or equal to 38C (100.4F), Mild Pain (1 - 3)  aluminum hydroxide/magnesium hydroxide/simethicone Suspension 30 milliLiter(s) Oral every 4 hours PRN Dyspepsia  melatonin 3 milliGRAM(s) Oral at bedtime PRN Insomnia  ondansetron Injectable 4 milliGRAM(s) IV Push every 8 hours PRN Nausea and/or Vomiting      ALLERGIES:  Allergies    No Known Allergies    Intolerances        LABS:                        12.8   6.86  )-----------( 244      ( 02 Jun 2025 07:48 )             40.0     06-02    138  |  100  |  12  ----------------------------<  96  3.6   |  24  |  0.5[L]    Ca    9.2      02 Jun 2025 07:48    TPro  6.8  /  Alb  4.1  /  TBili  0.4  /  DBili  x   /  AST  25  /  ALT  11  /  AlkPhos  78  06-02    PT/INR - ( 02 Jun 2025 01:54 )   PT: 11.40 sec;   INR: 0.97 ratio         PTT - ( 02 Jun 2025 07:49 )  PTT:57.8 sec  Urinalysis Basic - ( 02 Jun 2025 07:48 )    Color: x / Appearance: x / SG: x / pH: x  Gluc: 96 mg/dL / Ketone: x  / Bili: x / Urobili: x   Blood: x / Protein: x / Nitrite: x   Leuk Esterase: x / RBC: x / WBC x   Sq Epi: x / Non Sq Epi: x / Bacteria: x      CAPILLARY BLOOD GLUCOSE          RADIOLOGY & ADDITIONAL TESTS: Reviewed.    ASSESSMENT:    PLAN:

## 2025-06-02 NOTE — PATIENT PROFILE ADULT - TRANSPORTATION
Lab Results   Component Value Date    HGBA1C 5.6 05/18/2024   Patient's A1c much improved as above    Continue diabetic diet exercise diet lose weight   no

## 2025-06-02 NOTE — CONSULT NOTE ADULT - ASSESSMENT
69 yo F w/PMHx of GERD presents to ED for being unable to "catch her breath" Pt states she doesn't have SOB, but has been unable to "catch her breath".       Impression:  #Elevated Troponin  #GERD   71 yo F w/PMHx of GERD presents to ED for being unable to "catch her breath" Pt states she doesn't have SOB, but has been unable to "catch her breath".       Impression:  #Elevated Troponin  #SOB?  #GERD      Pt currently asymptomatic, and denies CP, SOB  Troponin initially elevated and now trending down  ECG: NSR, no acute ischemic changes      Reccs:  Obtain TTE  Check lipid profile, HgA1C, TSH  Keep NPO today for pharm nuc stress test, to eval for CAD  Monitor hebert

## 2025-06-02 NOTE — CONSULT NOTE ADULT - NS ATTEND AMEND GEN_ALL_CORE FT
Agree with plan noted above    Presents with SOB, found to have troponin peak of 193 now downtrending.  Recommend nuclear stress test to rule out ischemic equivalent.   TTE pending, will follow up after testing is complete.

## 2025-06-02 NOTE — PROGRESS NOTE ADULT - ASSESSMENT
70F w/PMHx of GERD presents to ED 2/2 SOB.  Patient reports symptoms began 5 days ago with sore throat, cough/congestion.  She reports she went to the [facility] today was diagnosed with URI and treated symptomatically.  She reports she went home and had an acute episode of SOB where she could not breathe.    # episode s of SOB  # recent pharyngitis   # elevated troponin, NSTEMI likely type2, demand ischemia   # GERD      PLANs:    - no CP or WHITNEY, DC heparin drip  - TTE w no acute pathology or ischemic concerns  - pending stress test  - trop down tredn, no need for repeat   - symptoms ( dysphagia/ intermittent SOB / throat dryness) is likley from GERD / pharyngitis   - needs GI/ ENT outpt eval  - PPI qd

## 2025-06-02 NOTE — CONSULT NOTE ADULT - SUBJECTIVE AND OBJECTIVE BOX
HPI:  70F w/PMHx of GERD presents to ED 2/2 SOB.  Patient reports symptoms began 5 days ago with sore throat, cough/congestion.  She [place] [facility] [place] and treated symptomatically.  She reports she went home and had an acute episode of SOB where she could not breathe.  She reports the symptoms gradually subsided but returned again shortly after.  At that point EMS was activated and patient transported to ED for evaluation.  Patient endorses 2 episodes of chest burning with radiation to LUE. She reports it happened after cough once on 2 nights ago that lasted for 30m and subsided and another yesterday that subsided.  She does reports Hx of GERD and did not take her Nexium prior to the episodes.  No WHITNEY.  No abdominal or urinary complaints.   (01 Jun 2025 17:12)        HPI-Cardiology   Pt with the above Hx and HPI, evaluated at bedside. Pt presented for eval of unable to catch her breath. Pt states she has not been SOB, but states cant catch her breath at rimes. States has been going for a weeks intermittently. Denies CP, palpitations, dizziness/lightheadedness or any other cardiac related symptoms. Radiology tests and hospital records, were reviewed, as well as previous notes on this patient.      PAST MEDICAL & SURGICAL HISTORY  GERD (gastroesophageal reflux disease)    No significant past surgical history          ALLERGIES:  No Known Allergies      MEDICATIONS:  MEDICATIONS  (STANDING):  aspirin  chewable 81 milliGRAM(s) Oral daily  chlorhexidine 2% Cloths 1 Application(s) Topical <User Schedule>  heparin  Infusion. 750 Unit(s)/Hr (7.5 mL/Hr) IV Continuous <Continuous>  pantoprazole    Tablet 40 milliGRAM(s) Oral before breakfast    MEDICATIONS  (PRN):  acetaminophen     Tablet .. 650 milliGRAM(s) Oral every 6 hours PRN Temp greater or equal to 38C (100.4F), Mild Pain (1 - 3)  aluminum hydroxide/magnesium hydroxide/simethicone Suspension 30 milliLiter(s) Oral every 4 hours PRN Dyspepsia  melatonin 3 milliGRAM(s) Oral at bedtime PRN Insomnia  ondansetron Injectable 4 milliGRAM(s) IV Push every 8 hours PRN Nausea and/or Vomiting      HOME MEDICATIONS:  Home Medications:  NexIUM 40 mg oral delayed release capsule: 1 cap(s) orally once a day (01 Jun 2025 17:16)      VITALS:   T(F): 97.6 (06-02 @ 04:48), Max: 98 (06-01 @ 14:34)  HR: 72 (06-02 @ 04:48) (72 - 84)  BP: 117/72 (06-02 @ 04:48) (117/72 - 134/86)  BP(mean): --  RR: 18 (06-02 @ 04:48) (18 - 18)  SpO2: 97% (06-02 @ 04:48) (97% - 100%)    I&O's Summary    01 Jun 2025 07:01  -  02 Jun 2025 07:00  --------------------------------------------------------  IN: 66 mL / OUT: 0 mL / NET: 66 mL        REVIEW OF SYSTEMS:  See HPI        PHYSICAL EXAM:  NEURO: patient is awake , alert and oriented  GEN: Not in acute distress  NECK: no thyroid enlargement, no JVD  LUNGS: Clear to auscultation bilaterally   CARDIOVASCULAR: S1/S2 present, RRR , no murmurs or rubs, no carotid bruits,  + PP bilaterally  ABD: Soft, non-tender, non-distended, +BS  EXT: No HAY  SKIN: Intact      LABS:                        12.8   6.86  )-----------( 244      ( 02 Jun 2025 07:48 )             40.0     06-01    134[L]  |  96[L]  |  9[L]  ----------------------------<  101[H]  4.6   |  25  |  0.6[L]    Ca    9.3      01 Jun 2025 11:50    TPro  7.5  /  Alb  4.3  /  TBili  0.5  /  DBili  x   /  AST  34  /  ALT  13  /  AlkPhos  86  06-01    PT/INR - ( 02 Jun 2025 01:54 )   PT: 11.40 sec;   INR: 0.97 ratio         PTT - ( 02 Jun 2025 07:49 )  PTT:57.8 sec      06-02 Chol 182 LDL -- HDL 70 Trig 89      RADIOLOGY:  -CXR:  < from: Xray Chest 2 Views PA/Lat (06.01.25 @ 11:50) >    IMPRESSION:    No radiographic evidence of acute cardiopulmonary disease.    --- End of Report ---      < end of copied text >          ECG:  < from: 12 Lead ECG (06.02.25 @ 01:59) >  Normal sinus rhythm  Normal ECG    Confirmed by Oracio Fenton ([id number]) on 6/2/2025 5:26:52 AM    < end of copied text >

## 2025-06-03 ENCOUNTER — TRANSCRIPTION ENCOUNTER (OUTPATIENT)
Age: 70
End: 2025-06-03

## 2025-06-03 VITALS
TEMPERATURE: 98 F | HEART RATE: 72 BPM | OXYGEN SATURATION: 96 % | SYSTOLIC BLOOD PRESSURE: 115 MMHG | RESPIRATION RATE: 18 BRPM | DIASTOLIC BLOOD PRESSURE: 72 MMHG

## 2025-06-03 LAB
BASOPHILS # BLD AUTO: 0.04 K/UL — SIGNIFICANT CHANGE UP (ref 0–0.2)
BASOPHILS NFR BLD AUTO: 0.6 % — SIGNIFICANT CHANGE UP (ref 0–1)
EOSINOPHIL # BLD AUTO: 0.29 K/UL — SIGNIFICANT CHANGE UP (ref 0–0.7)
EOSINOPHIL NFR BLD AUTO: 4.6 % — SIGNIFICANT CHANGE UP (ref 0–8)
HCT VFR BLD CALC: 38.3 % — SIGNIFICANT CHANGE UP (ref 37–47)
HGB BLD-MCNC: 12.3 G/DL — SIGNIFICANT CHANGE UP (ref 12–16)
IMM GRANULOCYTES NFR BLD AUTO: 0.2 % — SIGNIFICANT CHANGE UP (ref 0.1–0.3)
LYMPHOCYTES # BLD AUTO: 2.61 K/UL — SIGNIFICANT CHANGE UP (ref 1.2–3.4)
LYMPHOCYTES # BLD AUTO: 41.2 % — SIGNIFICANT CHANGE UP (ref 20.5–51.1)
MCHC RBC-ENTMCNC: 27.8 PG — SIGNIFICANT CHANGE UP (ref 27–31)
MCHC RBC-ENTMCNC: 32.1 G/DL — SIGNIFICANT CHANGE UP (ref 32–37)
MCV RBC AUTO: 86.7 FL — SIGNIFICANT CHANGE UP (ref 81–99)
MONOCYTES # BLD AUTO: 0.71 K/UL — HIGH (ref 0.1–0.6)
MONOCYTES NFR BLD AUTO: 11.2 % — HIGH (ref 1.7–9.3)
NEUTROPHILS # BLD AUTO: 2.68 K/UL — SIGNIFICANT CHANGE UP (ref 1.4–6.5)
NEUTROPHILS NFR BLD AUTO: 42.2 % — SIGNIFICANT CHANGE UP (ref 42.2–75.2)
NRBC BLD AUTO-RTO: 0 /100 WBCS — SIGNIFICANT CHANGE UP (ref 0–0)
PLATELET # BLD AUTO: 252 K/UL — SIGNIFICANT CHANGE UP (ref 130–400)
PMV BLD: 9 FL — SIGNIFICANT CHANGE UP (ref 7.4–10.4)
RBC # BLD: 4.42 M/UL — SIGNIFICANT CHANGE UP (ref 4.2–5.4)
RBC # FLD: 14.8 % — HIGH (ref 11.5–14.5)
WBC # BLD: 6.34 K/UL — SIGNIFICANT CHANGE UP (ref 4.8–10.8)
WBC # FLD AUTO: 6.34 K/UL — SIGNIFICANT CHANGE UP (ref 4.8–10.8)

## 2025-06-03 PROCEDURE — 99239 HOSP IP/OBS DSCHRG MGMT >30: CPT

## 2025-06-03 PROCEDURE — 99233 SBSQ HOSP IP/OBS HIGH 50: CPT

## 2025-06-03 RX ADMIN — DEXTROMETHORPHAN HBR, GUAIFENESIN 600 MILLIGRAM(S): 200 LIQUID ORAL at 05:50

## 2025-06-03 NOTE — DISCHARGE NOTE PROVIDER - HOSPITAL COURSE
70F w/PMHx of GERD presents to ED 2/2 SOB.  Patient reports symptoms began 5 days ago with sore throat, cough/congestion.  She reports she went to [place] [facility] [place] was diagnosed with URI and treated symptomatically.  She reports she went home and had an acute episode of SOB where she could not breathe.    # episode s of SOB  # recent pharyngitis   # elevated troponin, NSTEMI likely type2, demand ischemia   # GERD      PLANs:    - no CP or WHITNEY,   - TTE w no acute pathology or ischemic concerns  - neg stress test  - trop down trending, no need for repeat   - symptoms ( dysphagia/ intermittent SOB / throat dryness) is likley from GERD / pharyngitis   - needs GI/ ENT outpt eval  - PPI qd

## 2025-06-03 NOTE — DISCHARGE NOTE PROVIDER - PROVIDER TOKENS
PROVIDER:[TOKEN:[[id number]],FOLLOWUP:[2 weeks]],PROVIDER:[TOKEN:[[id number]],FOLLOWUP:[2 weeks]]

## 2025-06-03 NOTE — CHART NOTE - NSCHARTNOTEFT_GEN_A_CORE
69 yo F w/PMHx of GERD presents to ED for being unable to "catch her breath" Pt states she doesn't have SOB, but has been unable to "catch her breath".       Impression:  #Elevated Troponin  #SOB?  #GERD      Pt currently asymptomatic, and denies CP, SOB  Troponin initially elevated and now trending down  ECG: NSR, no acute ischemic changes      Reccs:  TTE with normal LVSF, EF 59%  Lexiscan NM stress with no evidence of ischemia   Outpatient f/u with cardiology      Discussed with Dr. Fenton, Hospitalist and Patient 69 yo F w/PMHx of GERD presents to ED for being unable to "catch her breath" Pt states she doesn't have SOB, but has been unable to "catch her breath".       Impression:  #Elevated Troponin  #SOB?  #GERD      Pt currently asymptomatic, and denies CP, SOB  Troponin initially elevated and now trending down  ECG: NSR, no acute ischemic changes      Reccs:  TTE with normal LVSF, EF 59%  Lexiscan NM stress with no evidence of ischemia   Outpatient f/u with cardiology      Discussed with Dr. Fenton, Hospitalist and Patient    Agree with plan noted above. No evidence of ischemia on stress test. TTE within normal limits No further cardiac workup. Plan as per primary team and dispo planning.

## 2025-06-03 NOTE — DISCHARGE NOTE NURSING/CASE MANAGEMENT/SOCIAL WORK - PATIENT PORTAL LINK FT
You can access the FollowMyHealth Patient Portal offered by [facility] by registering at the following website: [email or web address]. By joining [facility]’s FollowMyHealth portal, you will also be able to view your health information using other applications (apps) compatible with our system.

## 2025-06-03 NOTE — DISCHARGE NOTE NURSING/CASE MANAGEMENT/SOCIAL WORK - FINANCIAL ASSISTANCE
[facility] provides services at a reduced cost to those who are determined to be eligible through [facility]’s financial assistance program. Information regarding [facility]’s financial assistance program can be found by going to [email or web address] or by calling [phone].

## 2025-06-03 NOTE — DISCHARGE NOTE PROVIDER - NSDCCPCAREPLAN_GEN_ALL_CORE_FT
PRINCIPAL DISCHARGE DIAGNOSIS  Diagnosis: Non-ST elevation MI (NSTEMI)  Assessment and Plan of Treatment: this is not true heart disease its secinday to the throat infection, follow up with cardiology as outpateint      SECONDARY DISCHARGE DIAGNOSES  Diagnosis: Dysphagia  Assessment and Plan of Treatment: the difficulty rating can result in intermittent shortness of rbeath, take the Pantoprazole and make sre you follow up with the GI and ENT clinics

## 2025-06-03 NOTE — DISCHARGE NOTE PROVIDER - CARE PROVIDER_API CALL
Keegan Sainz  Gastroenterology  [address] [place] [id number]  Phone: [phone]  Fax: [phone]  Follow Up Time: 2 weeks    Gm Desir  Otolaryngology  [address] [place] [address]  Phone: [phone]  Fax: [phone]  Follow Up Time: 2 weeks

## 2025-06-03 NOTE — DISCHARGE NOTE PROVIDER - CARE PROVIDERS DIRECT ADDRESSES
,DirectAddress_Unknown,[email or web address]

## 2025-06-10 ENCOUNTER — APPOINTMENT (OUTPATIENT)
Dept: OTOLARYNGOLOGY | Facility: CLINIC | Age: 70
End: 2025-06-10

## 2025-06-10 VITALS — WEIGHT: 132 LBS | BODY MASS INDEX: 21.99 KG/M2 | HEIGHT: 65 IN

## 2025-06-10 DIAGNOSIS — K21.9 GASTRO-ESOPHAGEAL REFLUX DISEASE WITHOUT ESOPHAGITIS: ICD-10-CM

## 2025-06-10 DIAGNOSIS — I21.A1 MYOCARDIAL INFARCTION TYPE 2: ICD-10-CM

## 2025-06-10 DIAGNOSIS — J02.9 ACUTE PHARYNGITIS, UNSPECIFIED: ICD-10-CM

## 2025-06-10 PROBLEM — R49.0 DYSPHONIA: Status: ACTIVE | Noted: 2025-06-10

## 2025-06-10 PROBLEM — J38.3 PARADOXICAL VOCAL FOLD MOTION DISORDER: Status: ACTIVE | Noted: 2025-06-10

## 2025-06-10 PROBLEM — Z00.00 ENCOUNTER FOR PREVENTIVE HEALTH EXAMINATION: Status: ACTIVE | Noted: 2025-06-10

## 2025-06-10 PROCEDURE — 99203 OFFICE O/P NEW LOW 30 MIN: CPT | Mod: 25

## 2025-06-10 PROCEDURE — 31575 DIAGNOSTIC LARYNGOSCOPY: CPT

## 2025-06-26 ENCOUNTER — APPOINTMENT (OUTPATIENT)
Dept: CARDIOLOGY | Facility: CLINIC | Age: 70
End: 2025-06-26
Payer: COMMERCIAL

## 2025-06-26 ENCOUNTER — LABORATORY RESULT (OUTPATIENT)
Age: 70
End: 2025-06-26

## 2025-06-26 ENCOUNTER — APPOINTMENT (OUTPATIENT)
Dept: CARDIOLOGY | Facility: CLINIC | Age: 70
End: 2025-06-26

## 2025-06-26 VITALS
BODY MASS INDEX: 21.99 KG/M2 | HEIGHT: 65 IN | SYSTOLIC BLOOD PRESSURE: 132 MMHG | DIASTOLIC BLOOD PRESSURE: 80 MMHG | HEART RATE: 64 BPM | WEIGHT: 132 LBS

## 2025-06-26 PROBLEM — R06.00 DYSPNEA, UNSPECIFIED: Status: ACTIVE | Noted: 2025-06-10

## 2025-06-26 PROBLEM — R06.02 EXERTIONAL SHORTNESS OF BREATH: Status: ACTIVE | Noted: 2025-06-26

## 2025-06-26 PROCEDURE — 99204 OFFICE O/P NEW MOD 45 MIN: CPT | Mod: 25

## 2025-06-26 PROCEDURE — 93000 ELECTROCARDIOGRAM COMPLETE: CPT

## 2025-06-26 RX ORDER — ROSUVASTATIN CALCIUM 5 MG/1
5 TABLET, FILM COATED ORAL
Qty: 30 | Refills: 5 | Status: ACTIVE | COMMUNITY
Start: 2025-06-26 | End: 1900-01-01

## 2025-07-06 VITALS
DIASTOLIC BLOOD PRESSURE: 84 MMHG | OXYGEN SATURATION: 100 % | HEART RATE: 72 BPM | RESPIRATION RATE: 17 BRPM | SYSTOLIC BLOOD PRESSURE: 157 MMHG

## 2025-07-06 NOTE — H&P CARDIOLOGY - HISTORY OF PRESENT ILLNESS
Patient is a 70y Female PMH of HLD.  Pt c/o...............  S/p Normal NM stress 6/2/25   Presents today for [facility] with possible intervention.        NM Nuclear Stress Pharmacologic Multiple (06.02.25 @ 16:49)     PROCEDURE DATE:  06/02/2025        INTERPRETATION:  LEXISCAN AND REST MYOCARDIAL PERFUSION SPECT TOMOGRAPHY,   WALL MOTION ANALYSIS, LVEF CALCULATION  Reason: Shortness of breath, abnormal EKG    On viewing a cinematic display of the planar images, there is no   significant patient motion. There is significant breast attenuation.  On viewing the tomographic images in color and black and white, bullseye   polar map, and three-dimensional surface reconstruction, there is normal   isotope distribution throughout the myocardium on both sets of   acquisitions, with no evidence for ischemia during lexiscan infusion.  The lexiscan study was acquired as a gated study.  On viewing a cinematic   display of the frames, there is normal resting left ventricular wall   motion and wall thickening.  Analysis of the ventriculogram generates a calculated left ventricular   ejection fraction of 74 % which is within range of normal.  Impression:  1. NORMAL LEXISCAN / REST MYOCARDIAL PERFUSION SPECT TOMOGRAPHY, WITH NO   EVIDENCE FOR ISCHEMIA DURING LEXISCAN INFUSION.  2. NORMAL RESTING LEFT VENTRICULAR WALL MOTION AND WALL THICKENING.  3. LEFT VENTRICULAR EJECTION FRACTION OF  24 % WHICH IS WITHIN RANGE OF   NORMAL.          Pre cath note:  indication:  [ ] STEMI                [ ] NSTEMI                 [ ] Acute coronary syndrome                   [ ]Unstable Angina   [ ] high risk  [ ] intermediate risk  [ ] low risk                   [x ] Stable Angina     non-invasive testing:                          Date:        6/2025             result: [ ] high risk  [x ] intermediate risk  [ ] low risk    Anti- Anginal medications:                    [ ] not used d/t                     [ ] used   ( ) BB     ( ) CCB      ( ) Nitrate   (  ) Ranexa          [ ] not used but strong indication not to use    Ejection Fraction                   [ ] <29            [ ] 30-39%   [ ] 40-49%     [x ]>50%    CHF          [ ] active (within last 14 days on meds   [ ] Chronic (on meds but no exacerbation)  NYHA Functional Class:  (  ) Class I (no limitations)  (  ) Class II (slight limitation)  (  ) Class III (marked limitation)  (  ) Class IV (symptoms at rest)    COPD                   [ ] mild (on chronic bronchodilators)  [ ] moderate (on chronic steroid therapy)      [ ] severe (indication for home O2 or PACO2 >50)    Other risk factors:                     [ ] Previous MI                     [ ] CVA/ stroke                    [ ] carotid stent/ CEA                    [ ] PVD/PAD- (arterial aneurysm, non-palpable pulses, tortuous vessel with inability to insert catheter, infra-renal dissection, renal or subclavian artery stenosis)                    [ ] previous CABG                    [ ] Renal Failure:  on HD  (  ) yes  (  ) no                    [ ] Diabetic  (  ) Type 1  (  ) Type 2                                         (  ) Insulin dependent  (  ) non-insulin dependent                                         (  ) Metformin  (  ) Januvia  (  ) Glimepiride  (  ) Glipizide  (  ) Glyburide  (  ) Actos                                         (  ) GLP-1 receptor agonists (Ozempic, Mounjaro, Wegovy, Zepbound, Trulicity, Byetta, Victoza)                                         (  ) SGLT2 Inhibitors (Farxiga, Jardiance, Invokana)                                         (  ) Other          Bleeding Risk:  1.8%    Pre-cath Hydration: (  )  cc IV bolus x 1 over 1 hr followed by:    (  ) NS @ 75cc/hr until procedure (up to 2 hrs) if EF> 50%                                                                                                                             (  ) NS @ 50cc/hr until procedure (up to 2 hrs) if EF< 50%                                        (  ) No precath hydration d/t      RIGHT RADIAL ARTERY EVALUATION:  JOSLYN TEST: [] Negative          [] Positive    EF:   TTE Echo Complete w/o Contrast w/ Doppler (06.02.25 @ 10:07)   Summary:   1. Normal global left ventricular systolic function.   2. LV Ejection Fraction by Sanchez's Method with a biplane EF of 59 %.   3. Normal right ventricular size and function.   4. Normal left atrial size.   5. Normal right atrial size.   6. No significant valvular pathology.          EKG:   Date: Patient is a 70y Female PMH of HLD.  Pt c/o an episode of chest burning and BL arm discomfort after a viral illness. She was admitted to [facility] and was found to have elevated troponins.  S/p Normal NM stress 6/2/25.    Presents today for [facility] with possible intervention.        NM Nuclear Stress Pharmacologic Multiple (06.02.25 @ 16:49)     PROCEDURE DATE:  06/02/2025        INTERPRETATION:  LEXISCAN AND REST MYOCARDIAL PERFUSION SPECT TOMOGRAPHY,   WALL MOTION ANALYSIS, LVEF CALCULATION  Reason: Shortness of breath, abnormal EKG    On viewing a cinematic display of the planar images, there is no   significant patient motion. There is significant breast attenuation.  On viewing the tomographic images in color and black and white, bullseye   polar map, and three-dimensional surface reconstruction, there is normal   isotope distribution throughout the myocardium on both sets of   acquisitions, with no evidence for ischemia during lexiscan infusion.  The lexiscan study was acquired as a gated study.  On viewing a cinematic   display of the frames, there is normal resting left ventricular wall   motion and wall thickening.  Analysis of the ventriculogram generates a calculated left ventricular   ejection fraction of 74 % which is within range of normal.  Impression:  1. NORMAL LEXISCAN / REST MYOCARDIAL PERFUSION SPECT TOMOGRAPHY, WITH NO   EVIDENCE FOR ISCHEMIA DURING LEXISCAN INFUSION.  2. NORMAL RESTING LEFT VENTRICULAR WALL MOTION AND WALL THICKENING.  3. LEFT VENTRICULAR EJECTION FRACTION OF  24 % WHICH IS WITHIN RANGE OF   NORMAL.          Pre cath note:  indication:  [ ] STEMI                [ ] NSTEMI                 [ ] Acute coronary syndrome                   [ ]Unstable Angina   [ ] high risk  [ ] intermediate risk  [ ] low risk                   [x ] Stable Angina     non-invasive testing:    nst         Date:        6/2025             result: [ ] high risk  [x ] intermediate risk  [ ] low risk    Anti- Anginal medications:                    [ ] not used d/t                     [x ] used   ( ) BB     (x ) CCB      ( ) Nitrate   ( x ) Ranexa          [ ] not used but strong indication not to use    Ejection Fraction                   [ ] <29            [ ] 30-39%   [ ] 40-49%     [x ]>50%    CHF          [ ] active (within last 14 days on meds   [ ] Chronic (on meds but no exacerbation)  NYHA Functional Class:  (  ) Class I (no limitations)  (  ) Class II (slight limitation)  (  ) Class III (marked limitation)  (  ) Class IV (symptoms at rest)    COPD                   [ ] mild (on chronic bronchodilators)  [ ] moderate (on chronic steroid therapy)      [ ] severe (indication for home O2 or PACO2 >50)    Other risk factors:                     [ ] Previous MI                     [ ] CVA/ stroke                    [ ] carotid stent/ CEA                    [ ] PVD/PAD- (arterial aneurysm, non-palpable pulses, tortuous vessel with inability to insert catheter, infra-renal dissection, renal or subclavian artery stenosis)                    [ ] previous CABG                    [ ] Renal Failure:  on HD  (  ) yes  (  ) no                    [ ] Diabetic  (  ) Type 1  (  ) Type 2                                         (  ) Insulin dependent  (  ) non-insulin dependent                                         (  ) Metformin  (  ) Januvia  (  ) Glimepiride  (  ) Glipizide  (  ) Glyburide  (  ) Actos                                         (  ) GLP-1 receptor agonists (Ozempic, Mounjaro, Wegovy, Zepbound, Trulicity, Byetta, Victoza)                                         (  ) SGLT2 Inhibitors (Farxiga, Jardiance, Invokana)                                         (  ) Other          Bleeding Risk:  1.8%    Pre-cath Hydration: ( x )  cc IV bolus x 1 over 1 hr followed by:    ( x ) NS @ 75cc/hr until procedure (up to 2 hrs) if EF> 50%                                                                                                                             (  ) NS @ 50cc/hr until procedure (up to 2 hrs) if EF< 50%                                        (  ) No precath hydration d/t      RIGHT RADIAL ARTERY EVALUATION:  JOSLYN TEST: [] Negative          [x] Positive    EF:   TTE Echo Complete w/o Contrast w/ Doppler (06.02.25 @ 10:07)   Summary:   1. Normal global left ventricular systolic function.   2. LV Ejection Fraction by Sanchez's Method with a biplane EF of 59 %.   3. Normal right ventricular size and function.   4. Normal left atrial size.   5. Normal right atrial size.   6. No significant valvular pathology.          EKG: SR 64 bpm  Date: 6/26

## 2025-07-08 ENCOUNTER — OUTPATIENT (OUTPATIENT)
Dept: OUTPATIENT SERVICES | Facility: HOSPITAL | Age: 70
LOS: 1 days | Discharge: HOME | End: 2025-07-08
Payer: COMMERCIAL

## 2025-07-08 ENCOUNTER — TRANSCRIPTION ENCOUNTER (OUTPATIENT)
Age: 70
End: 2025-07-08

## 2025-07-08 VITALS
RESPIRATION RATE: 15 BRPM | SYSTOLIC BLOOD PRESSURE: 109 MMHG | HEART RATE: 61 BPM | OXYGEN SATURATION: 98 % | DIASTOLIC BLOOD PRESSURE: 64 MMHG

## 2025-07-08 DIAGNOSIS — I25.10 ATHEROSCLEROTIC HEART DISEASE OF NATIVE CORONARY ARTERY WITHOUT ANGINA PECTORIS: ICD-10-CM

## 2025-07-08 LAB
ANION GAP SERPL CALC-SCNC: 15 MMOL/L — HIGH (ref 7–14)
BUN SERPL-MCNC: 13 MG/DL — SIGNIFICANT CHANGE UP (ref 10–20)
CALCIUM SERPL-MCNC: 9.2 MG/DL — SIGNIFICANT CHANGE UP (ref 8.4–10.5)
CHLORIDE SERPL-SCNC: 101 MMOL/L — SIGNIFICANT CHANGE UP (ref 98–110)
CO2 SERPL-SCNC: 23 MMOL/L — SIGNIFICANT CHANGE UP (ref 17–32)
CREAT SERPL-MCNC: 0.6 MG/DL — LOW (ref 0.7–1.5)
EGFR: 98 ML/MIN/1.73M2 — SIGNIFICANT CHANGE UP
EGFR: 98 ML/MIN/1.73M2 — SIGNIFICANT CHANGE UP
GLUCOSE SERPL-MCNC: 90 MG/DL — SIGNIFICANT CHANGE UP (ref 70–99)
HCT VFR BLD CALC: 41.6 % — SIGNIFICANT CHANGE UP (ref 37–47)
HGB BLD-MCNC: 13.1 G/DL — SIGNIFICANT CHANGE UP (ref 12–16)
MCHC RBC-ENTMCNC: 27.8 PG — SIGNIFICANT CHANGE UP (ref 27–31)
MCHC RBC-ENTMCNC: 31.5 G/DL — LOW (ref 32–37)
MCV RBC AUTO: 88.1 FL — SIGNIFICANT CHANGE UP (ref 81–99)
NRBC BLD AUTO-RTO: 0 /100 WBCS — SIGNIFICANT CHANGE UP (ref 0–0)
PLATELET # BLD AUTO: 314 K/UL — SIGNIFICANT CHANGE UP (ref 130–400)
PMV BLD: 9.4 FL — SIGNIFICANT CHANGE UP (ref 7.4–10.4)
POTASSIUM SERPL-MCNC: 3.8 MMOL/L — SIGNIFICANT CHANGE UP (ref 3.5–5)
POTASSIUM SERPL-SCNC: 3.8 MMOL/L — SIGNIFICANT CHANGE UP (ref 3.5–5)
RBC # BLD: 4.72 M/UL — SIGNIFICANT CHANGE UP (ref 4.2–5.4)
RBC # FLD: 14.6 % — HIGH (ref 11.5–14.5)
SODIUM SERPL-SCNC: 139 MMOL/L — SIGNIFICANT CHANGE UP (ref 135–146)
WBC # BLD: 7.96 K/UL — SIGNIFICANT CHANGE UP (ref 4.8–10.8)
WBC # FLD AUTO: 7.96 K/UL — SIGNIFICANT CHANGE UP (ref 4.8–10.8)

## 2025-07-08 PROCEDURE — 80048 BASIC METABOLIC PNL TOTAL CA: CPT

## 2025-07-08 PROCEDURE — C1894: CPT

## 2025-07-08 PROCEDURE — 93458 L HRT ARTERY/VENTRICLE ANGIO: CPT | Mod: 26

## 2025-07-08 PROCEDURE — 36415 COLL VENOUS BLD VENIPUNCTURE: CPT

## 2025-07-08 PROCEDURE — 93458 L HRT ARTERY/VENTRICLE ANGIO: CPT

## 2025-07-08 PROCEDURE — C1769: CPT

## 2025-07-08 PROCEDURE — 85027 COMPLETE CBC AUTOMATED: CPT

## 2025-07-08 PROCEDURE — 99152 MOD SED SAME PHYS/QHP 5/>YRS: CPT

## 2025-07-08 PROCEDURE — C1887: CPT

## 2025-07-08 RX ORDER — ASPIRIN 325 MG
0 TABLET ORAL
Refills: 0 | DISCHARGE

## 2025-07-08 RX ORDER — AMLODIPINE BESYLATE 10 MG/1
2.5 TABLET ORAL ONCE
Refills: 0 | Status: COMPLETED | OUTPATIENT
Start: 2025-07-08 | End: 2025-07-08

## 2025-07-08 RX ORDER — ROSUVASTATIN CALCIUM 5 MG/1
1 TABLET, FILM COATED ORAL
Refills: 0 | DISCHARGE

## 2025-07-08 RX ORDER — RANOLAZINE 1000 MG/1
500 TABLET, FILM COATED, EXTENDED RELEASE ORAL ONCE
Refills: 0 | Status: COMPLETED | OUTPATIENT
Start: 2025-07-08 | End: 2025-07-08

## 2025-07-08 RX ADMIN — RANOLAZINE 500 MILLIGRAM(S): 1000 TABLET, FILM COATED, EXTENDED RELEASE ORAL at 06:42

## 2025-07-08 RX ADMIN — Medication 250 MILLILITER(S): at 06:42

## 2025-07-08 RX ADMIN — AMLODIPINE BESYLATE 2.5 MILLIGRAM(S): 10 TABLET ORAL at 06:42

## 2025-07-08 RX ADMIN — Medication 75 MILLILITER(S): at 06:42

## 2025-07-08 RX ADMIN — Medication 75 MILLILITER(S): at 08:11

## 2025-07-08 NOTE — CHART NOTE - NSCHARTNOTEFT_GEN_A_CORE
PRE-OP DIAGNOSIS: Chest pain, suspected CAD  AUC 7stable       PROCEDURE:     [x] Coronary Angiogram     [x] LHC     [] LVG     [] RHC     [] Intervention (see below)         PHYSICIAN: Dr. Torrez    ASSISTANT: MARTIN Henry        PROCEDURE DESCRIPTION:     Consent:      [x] Patient     [] Family Member     []  Used        Anesthesia:     [] General     [x] Sedation     [x] Local        Access & Closure:     [x] 6 Fr Radial Artery  - TR band     IV Contrast: 50 mL        Intervention: none      Implants: none       FINDINGS:     General Diagnostic Impressions  Coronary angiography demonstrates minor luminal irregularities.     Procedure Narrative:  The risks and alternatives of the procedures and conscious sedation were explained to the patient and informed consent was obtained. The patient was brought to the cath lab and placed on the exam table.   Access  Right radial artery:  The puncture site was infiltrated with 2% Lidocaine. Vascular access was obtained using modified seldinger technique with a Micro needle and a 6FRGLIDESHEATH SLENDER FLEX  was advanced into the vessel. Hemostasis/Sheath Status: Hemostasis was successful using TR Band ( TR Band Radial WITH INFLATOR STNDRD).       Coronary Angiography  Left Coronary System:  A  JL3.5 [id number] 5/BX was positioned into the vessel ostia under fluoroscopic guidance. Contrast injections were performed using hand injection. Angiograms were obtained in multiple views.  Right Coronary System:  A JR4 [id number] was positioned into the vessel ostia under fluoroscopic guidance. Contrast injections were performed using hand injection. Angiograms were obtained in multiple views.    Left Heart Cath  A JR4 [id number] was successfully advanced across the aortic valve, placed in the left ventricle and pressures were recorded.     Diagnostic Findings:    Coronary Angiography  The coronary circulation is right dominant. Cardiac catheterization was performed electively.     LM  Left main artery: Angiography shows minor irregularities.     LAD  Left anterior descending artery: The segment is average to large size and moderately tortuous. Proximal left anterior descending: Angiography shows minor irregularities. Mid left anterior descending: Angiography shows minor irregularities. Distal left anterior descending: Angiography shows minor irregularities. First diagonal: Angiography shows minor irregularities.     CX  Circumflex: The segment is average to large size and moderately tortuous. Proximal circumflex: Angiography shows minor irregularities. Distal circumflex: Angiography shows minor irregularities. First obtuse marginal: Angiography shows minor irregularities.     RCA  Right coronary artery: The segment is average to large size and moderately tortuous. Proximal right coronary artery: Angiography shows minor irregularities. Mid right coronary artery: Angiography shows minor irregularities. Distal right coronary artery: Angiography shows minor irregularities. Right posterior descending artery: Angiography shows minor irregularities. First right posterolateral: Angiography shows minor irregularities.       Left Heart Cath  Left ventricular function was assessed and demonstrates normal LV wall motion. Ejection fraction was visually estimated by LV Gram with a value of 65%. LV to AO pullback was performed and there is no pressure gradient. The left ventricular end diastolic pressure was 8 mmHg.            ESTIMATED BLOOD LOSS: < 10 mL        CONDITION:     [x] Good     [] Fair     [] Critical        SPECIMEN REMOVED: N/A       POST-OP DIAGNOSIS:      There is no significant coronary artery disease.      If Diagnostic CATH - SYNTAX score for CABG:      PLAN OF CARE:     Recommendations:    [x] D/C Home Today     [] Return to In-patient bed     [] Admit for observation     [] Return for Staged Procedure     [] CT Surgery Consult     [x] Medications: resume home mediations      [x] IV Fluids: NS at 75 cc/hr x 2 hr,   The patient has been instructed to follow up with the referring physician in 2 weeks PRE-OP DIAGNOSIS: Chest pain, suspected CAD  AUC 7stable       PROCEDURE:     [x] Coronary Angiogram     [x] LHC     [] LVG     [] RHC     [] Intervention (see below)         PHYSICIAN: Dr. Lore FIGUEROA [facility]    ASSISTANT: MARTIN Henry        PROCEDURE DESCRIPTION:     Consent:      [x] Patient     [] Family Member     []  Used        Anesthesia:     [] General     [x] Sedation     [x] Local        Access & Closure:     [x] 6 Fr Radial Artery  - TR band     IV Contrast: 50 mL        Intervention: none      Implants: none       FINDINGS:     General Diagnostic Impressions  Coronary angiography demonstrates minor luminal irregularities.     Procedure Narrative:  The risks and alternatives of the procedures and conscious sedation were explained to the patient and informed consent was obtained. The patient was brought to the cath lab and placed on the exam table.   Access  Right radial artery:  The puncture site was infiltrated with 2% Lidocaine. Vascular access was obtained using modified Seldinger technique with a Micro needle and a 6FRGLIDESHEATH SLENDER FLEX  was advanced into the vessel. Hemostasis/Sheath Status: Hemostasis was successful using TR Band ( TR Band Radial WITH INFLATOR STNDRD).       Coronary Angiography  Left Coronary System:  A  JL3.5 [id number] 5/BX was positioned into the vessel ostia under fluoroscopic guidance. Contrast injections were performed using hand injection. Angiograms were obtained in multiple views.  Right Coronary System:  A JR4 [id number] was positioned into the vessel ostia under fluoroscopic guidance. Contrast injections were performed using hand injection. Angiograms were obtained in multiple views.    Left Heart Cath  A JR4 [id number] was successfully advanced across the aortic valve, placed in the left ventricle and pressures were recorded.     Diagnostic Findings:    Coronary Angiography  The coronary circulation is right dominant. Cardiac catheterization was performed electively.     LM  Left main artery: Angiography shows minor irregularities.     LAD  Left anterior descending artery: The segment is average to large size and moderately tortuous. Proximal left anterior descending: Angiography shows minor irregularities. Mid left anterior descending: Angiography shows minor irregularities. Distal left anterior descending: Angiography shows minor irregularities. First diagonal: Angiography shows minor irregularities.     CX  Circumflex: The segment is average to large size and moderately tortuous. Proximal circumflex: Angiography shows minor irregularities. Distal circumflex: Angiography shows minor irregularities. First obtuse marginal: Angiography shows minor irregularities.     RCA  Right coronary artery: The segment is average to large size and moderately tortuous. Proximal right coronary artery: Angiography shows minor irregularities. Mid right coronary artery: Angiography shows minor irregularities. Distal right coronary artery: Angiography shows minor irregularities. Right posterior descending artery: Angiography shows minor irregularities. First right posterolateral: Angiography shows minor irregularities.       Left Heart Cath  Left ventricular function was assessed and demonstrates normal LV wall motion. Ejection fraction was visually estimated by LV Gram with a value of 65%. LV to AO pullback was performed and there is no pressure gradient. The left ventricular end diastolic pressure was 8 mmHg.            ESTIMATED BLOOD LOSS: < 10 mL        CONDITION:     [x] Good     [] Fair     [] Critical        SPECIMEN REMOVED: N/A       POST-OP DIAGNOSIS:      There is no significant coronary artery disease.      If Diagnostic CATH - SYNTAX score for CABG:      PLAN OF CARE:     Recommendations:    [x] D/C Home Today     [] Return to In-patient bed     [] Admit for observation     [] Return for Staged Procedure     [] CT Surgery Consult     [x] Medications: resume home mediations      [x] IV Fluids: NS at 75 cc/hr x 2 hr,   The patient has been instructed to follow up with the referring physician in 2 weeks

## 2025-07-08 NOTE — ASU DISCHARGE PLAN (ADULT/PEDIATRIC) - FINANCIAL ASSISTANCE
[facility] provides services at a reduced cost to those who are determined to be eligible through [facility]’s financial assistance program. Information regarding [facility]’s financial assistance program can be found by going to [email or web address] or by calling [phone].

## 2025-07-08 NOTE — ASU DISCHARGE PLAN (ADULT/PEDIATRIC) - CARE PROVIDER_API CALL
Noman Torrez  Cardiovascular Disease  [address] [place] [address], [address]  [place] [address]  Phone: [phone]  Fax: [phone]  Follow Up Time: 2 weeks

## 2025-07-08 NOTE — ASU DISCHARGE PLAN (ADULT/PEDIATRIC) - NS MD DC FALL RISK RISK
For information on Fall & Injury Prevention, visit: [email or web address] OR  [email or web address] OR  https://www.cdc.gov/steadi/patient.html

## 2025-07-08 NOTE — ASU DISCHARGE PLAN (ADULT/PEDIATRIC) - ASU DC SPECIAL INSTRUCTIONSFT
Activity:  - Do not drive or operate heavy machinery for 24 hours.  - Limit your physical or any strenuous activity for 2 weeks after angioplasty and 48 hours for angiogram. Support the groin site with your hand when you sneeze or cough. No heavy lifting ( objects more then 10 pounds).  - For wrist access, avoid using affected arm for 24 hours after removal of dressing and avoid heavy lifting for 7 days.  Hygiene:  - After 24 hours, you may shower and remove the dressing from the site. Do not tub bathe for one week. Do not rub or apply lotion, cream, powder to the affected site. Leave it open to air.   Diet:   - You may resume your diet. Low Sodium. Low Fat, Low Cholesterol.  If Diabetic - Carbohydrate Consistent Diet.      - Drink extra fluid unless otherwise advised.   Special Instructions:  - Bruising or black and blue at the puncture site is possible.  - If there is bleeding from the puncture site (groin or wrist) apply direct firm pressure on the site and call 911.  - Any sudden swelling, redness, fever, discharge or severe pain, call your physician or call the cath lab.   - If you notice any scab formation in the area avoid touching the site and allow it to heal.  - Numbness or "pins and needle" sensation in the affected arm, hand, leg or if the affected site become cool to touch or pale that persist for extended period of time call your physician immediately to be checked.  - If you developed chest pain, not relieved by your usual routine medication, fainting, lethargy, weakness, report to the nearest emergency room.   - Inform your Dentist or Surgeon if you are taking Aspirin or any antiplatelet medications. Report any bleeding in your urine or stool.   Medications:  - Soreness or tenderness at the site is possible it will diminish over time. You may take Tylenol every 4-6 hours as needed. Nothing stronger is needed.  - If you are diabetic and taking medication containing Metformin, do not take them for 48 hours after the procedure.     Any questions call Cardiac Cath Lab at [phone] or [phone], Monday - Friday from 7 - 9 pm.

## 2025-07-10 PROBLEM — E78.5 HYPERLIPIDEMIA, UNSPECIFIED: Chronic | Status: ACTIVE | Noted: 2025-07-06

## 2025-07-11 DIAGNOSIS — I10 ESSENTIAL (PRIMARY) HYPERTENSION: ICD-10-CM

## 2025-07-11 DIAGNOSIS — E78.5 HYPERLIPIDEMIA, UNSPECIFIED: ICD-10-CM

## 2025-07-11 DIAGNOSIS — R07.9 CHEST PAIN, UNSPECIFIED: ICD-10-CM

## 2025-07-13 ENCOUNTER — OUTPATIENT (OUTPATIENT)
Dept: OUTPATIENT SERVICES | Facility: HOSPITAL | Age: 70
LOS: 1 days | End: 2025-07-13
Payer: COMMERCIAL

## 2025-07-13 DIAGNOSIS — Z00.8 ENCOUNTER FOR OTHER GENERAL EXAMINATION: ICD-10-CM

## 2025-07-13 DIAGNOSIS — J38.3 OTHER DISEASES OF VOCAL CORDS: ICD-10-CM

## 2025-07-13 PROCEDURE — 70491 CT SOFT TISSUE NECK W/DYE: CPT

## 2025-07-13 PROCEDURE — 70491 CT SOFT TISSUE NECK W/DYE: CPT | Mod: 26

## 2025-07-14 DIAGNOSIS — J38.3 OTHER DISEASES OF VOCAL CORDS: ICD-10-CM

## 2025-07-17 ENCOUNTER — APPOINTMENT (OUTPATIENT)
Dept: CARDIOLOGY | Facility: CLINIC | Age: 70
End: 2025-07-17
Payer: COMMERCIAL

## 2025-07-17 VITALS
SYSTOLIC BLOOD PRESSURE: 122 MMHG | BODY MASS INDEX: 21.99 KG/M2 | DIASTOLIC BLOOD PRESSURE: 70 MMHG | HEIGHT: 65 IN | WEIGHT: 132 LBS

## 2025-07-17 PROBLEM — I25.10 ASHD (ARTERIOSCLEROTIC HEART DISEASE): Status: ACTIVE | Noted: 2025-07-17

## 2025-07-17 PROBLEM — I21.4 NSTEMI, INITIAL EPISODE OF CARE: Status: ACTIVE | Noted: 2025-06-26

## 2025-07-17 PROBLEM — E78.2 MIXED HYPERLIPIDEMIA: Status: ACTIVE | Noted: 2025-06-26

## 2025-07-17 PROCEDURE — 99214 OFFICE O/P EST MOD 30 MIN: CPT

## 2025-07-18 ENCOUNTER — APPOINTMENT (OUTPATIENT)
Dept: OTOLARYNGOLOGY | Facility: CLINIC | Age: 70
End: 2025-07-18
Payer: COMMERCIAL

## 2025-07-18 PROCEDURE — 92524 BEHAVRAL QUALIT ANALYS VOICE: CPT | Mod: GN,59

## 2025-07-18 PROCEDURE — 92507 TX SP LANG VOICE COMM INDIV: CPT | Mod: GN,59

## 2025-07-22 ENCOUNTER — APPOINTMENT (OUTPATIENT)
Dept: OTOLARYNGOLOGY | Facility: CLINIC | Age: 70
End: 2025-07-22
Payer: MEDICARE

## 2025-07-22 PROCEDURE — 92507 TX SP LANG VOICE COMM INDIV: CPT | Mod: GN

## 2025-07-28 ENCOUNTER — APPOINTMENT (OUTPATIENT)
Dept: OTOLARYNGOLOGY | Facility: CLINIC | Age: 70
End: 2025-07-28
Payer: MEDICARE

## 2025-07-28 PROCEDURE — 92507 TX SP LANG VOICE COMM INDIV: CPT | Mod: GN

## 2025-08-05 ENCOUNTER — APPOINTMENT (OUTPATIENT)
Dept: OTOLARYNGOLOGY | Facility: CLINIC | Age: 70
End: 2025-08-05
Payer: COMMERCIAL

## 2025-08-05 PROCEDURE — 92507 TX SP LANG VOICE COMM INDIV: CPT | Mod: GN

## 2025-08-11 ENCOUNTER — APPOINTMENT (OUTPATIENT)
Dept: OTOLARYNGOLOGY | Facility: CLINIC | Age: 70
End: 2025-08-11
Payer: COMMERCIAL

## 2025-08-11 PROCEDURE — 92507 TX SP LANG VOICE COMM INDIV: CPT | Mod: GN

## 2025-08-22 ENCOUNTER — APPOINTMENT (OUTPATIENT)
Dept: OTOLARYNGOLOGY | Facility: CLINIC | Age: 70
End: 2025-08-22
Payer: COMMERCIAL

## 2025-08-22 VITALS — HEIGHT: 65 IN | WEIGHT: 132 LBS | BODY MASS INDEX: 21.99 KG/M2

## 2025-08-22 DIAGNOSIS — I67.1 CEREBRAL ANEURYSM, NONRUPTURED: ICD-10-CM

## 2025-08-22 DIAGNOSIS — J38.3 OTHER DISEASES OF VOCAL CORDS: ICD-10-CM

## 2025-08-22 PROCEDURE — 92507 TX SP LANG VOICE COMM INDIV: CPT | Mod: GN

## 2025-08-22 PROCEDURE — G2211 COMPLEX E/M VISIT ADD ON: CPT | Mod: NC

## 2025-08-22 PROCEDURE — 99214 OFFICE O/P EST MOD 30 MIN: CPT

## 2025-08-26 ENCOUNTER — APPOINTMENT (OUTPATIENT)
Dept: OTOLARYNGOLOGY | Facility: CLINIC | Age: 70
End: 2025-08-26

## 2025-09-05 ENCOUNTER — APPOINTMENT (OUTPATIENT)
Dept: OTOLARYNGOLOGY | Facility: CLINIC | Age: 70
End: 2025-09-05

## 2025-09-10 ENCOUNTER — APPOINTMENT (OUTPATIENT)
Dept: NEUROSURGERY | Facility: CLINIC | Age: 70
End: 2025-09-10

## 2025-09-10 VITALS
HEART RATE: 72 BPM | WEIGHT: 132 LBS | OXYGEN SATURATION: 98 % | SYSTOLIC BLOOD PRESSURE: 137 MMHG | DIASTOLIC BLOOD PRESSURE: 76 MMHG | BODY MASS INDEX: 21.99 KG/M2 | HEIGHT: 65 IN

## 2025-09-10 DIAGNOSIS — I67.1 CEREBRAL ANEURYSM, NONRUPTURED: ICD-10-CM

## 2025-09-10 RX ORDER — ASPIRIN 325 MG
TABLET ORAL
Refills: 0 | Status: ACTIVE | COMMUNITY